# Patient Record
Sex: FEMALE | Race: WHITE | NOT HISPANIC OR LATINO | Employment: FULL TIME | URBAN - METROPOLITAN AREA
[De-identification: names, ages, dates, MRNs, and addresses within clinical notes are randomized per-mention and may not be internally consistent; named-entity substitution may affect disease eponyms.]

---

## 2018-09-05 ENCOUNTER — APPOINTMENT (OUTPATIENT)
Dept: URGENT CARE | Facility: MEDICAL CENTER | Age: 33
End: 2018-09-05
Payer: OTHER MISCELLANEOUS

## 2018-09-05 PROCEDURE — G0382 LEV 3 HOSP TYPE B ED VISIT: HCPCS

## 2018-09-05 PROCEDURE — 99283 EMERGENCY DEPT VISIT LOW MDM: CPT

## 2018-09-13 ENCOUNTER — APPOINTMENT (OUTPATIENT)
Dept: URGENT CARE | Facility: MEDICAL CENTER | Age: 33
End: 2018-09-13
Payer: OTHER MISCELLANEOUS

## 2018-09-13 PROCEDURE — 99214 OFFICE O/P EST MOD 30 MIN: CPT | Performed by: NURSE PRACTITIONER

## 2018-09-27 ENCOUNTER — APPOINTMENT (OUTPATIENT)
Dept: URGENT CARE | Facility: MEDICAL CENTER | Age: 33
End: 2018-09-27
Payer: OTHER MISCELLANEOUS

## 2018-09-27 PROCEDURE — 99213 OFFICE O/P EST LOW 20 MIN: CPT | Performed by: NURSE PRACTITIONER

## 2018-10-04 ENCOUNTER — APPOINTMENT (OUTPATIENT)
Dept: URGENT CARE | Facility: MEDICAL CENTER | Age: 33
End: 2018-10-04
Payer: OTHER MISCELLANEOUS

## 2018-10-04 PROCEDURE — 99213 OFFICE O/P EST LOW 20 MIN: CPT | Performed by: NURSE PRACTITIONER

## 2019-04-11 ENCOUNTER — OFFICE VISIT (OUTPATIENT)
Dept: FAMILY MEDICINE CLINIC | Facility: CLINIC | Age: 34
End: 2019-04-11
Payer: COMMERCIAL

## 2019-04-11 VITALS
HEART RATE: 94 BPM | HEIGHT: 69 IN | WEIGHT: 196 LBS | BODY MASS INDEX: 29.03 KG/M2 | OXYGEN SATURATION: 98 % | DIASTOLIC BLOOD PRESSURE: 80 MMHG | SYSTOLIC BLOOD PRESSURE: 130 MMHG | TEMPERATURE: 99 F

## 2019-04-11 DIAGNOSIS — J02.9 SORE THROAT: Primary | ICD-10-CM

## 2019-04-11 DIAGNOSIS — J02.9 PHARYNGITIS, UNSPECIFIED ETIOLOGY: ICD-10-CM

## 2019-04-11 LAB — S PYO AG THROAT QL: NEGATIVE

## 2019-04-11 PROCEDURE — 1036F TOBACCO NON-USER: CPT | Performed by: NURSE PRACTITIONER

## 2019-04-11 PROCEDURE — 99213 OFFICE O/P EST LOW 20 MIN: CPT | Performed by: NURSE PRACTITIONER

## 2019-04-11 PROCEDURE — 3008F BODY MASS INDEX DOCD: CPT | Performed by: NURSE PRACTITIONER

## 2019-04-11 PROCEDURE — 87880 STREP A ASSAY W/OPTIC: CPT | Performed by: NURSE PRACTITIONER

## 2019-04-11 PROCEDURE — 87070 CULTURE OTHR SPECIMN AEROBIC: CPT | Performed by: NURSE PRACTITIONER

## 2019-04-11 RX ORDER — AZITHROMYCIN 250 MG/1
TABLET, FILM COATED ORAL
Qty: 6 TABLET | Refills: 0 | Status: SHIPPED | OUTPATIENT
Start: 2019-04-11 | End: 2019-04-16

## 2019-04-13 LAB — BACTERIA THROAT CULT: NORMAL

## 2019-04-22 ENCOUNTER — OFFICE VISIT (OUTPATIENT)
Dept: FAMILY MEDICINE CLINIC | Facility: CLINIC | Age: 34
End: 2019-04-22
Payer: COMMERCIAL

## 2019-04-22 VITALS
TEMPERATURE: 98 F | DIASTOLIC BLOOD PRESSURE: 74 MMHG | SYSTOLIC BLOOD PRESSURE: 108 MMHG | HEART RATE: 85 BPM | HEIGHT: 69 IN | WEIGHT: 197 LBS | OXYGEN SATURATION: 98 % | BODY MASS INDEX: 29.18 KG/M2

## 2019-04-22 DIAGNOSIS — J02.9 SORE THROAT: Primary | ICD-10-CM

## 2019-04-22 LAB — S PYO AG THROAT QL: NEGATIVE

## 2019-04-22 PROCEDURE — 3008F BODY MASS INDEX DOCD: CPT | Performed by: FAMILY MEDICINE

## 2019-04-22 PROCEDURE — 87880 STREP A ASSAY W/OPTIC: CPT | Performed by: FAMILY MEDICINE

## 2019-04-22 PROCEDURE — 99213 OFFICE O/P EST LOW 20 MIN: CPT | Performed by: FAMILY MEDICINE

## 2019-04-22 PROCEDURE — 87070 CULTURE OTHR SPECIMN AEROBIC: CPT | Performed by: FAMILY MEDICINE

## 2019-04-22 RX ORDER — MONTELUKAST SODIUM 10 MG/1
10 TABLET ORAL
Qty: 14 TABLET | Refills: 0 | Status: SHIPPED | OUTPATIENT
Start: 2019-04-22

## 2019-04-22 RX ORDER — FLUTICASONE PROPIONATE 50 MCG
2 SPRAY, SUSPENSION (ML) NASAL DAILY
Qty: 16 G | Refills: 0 | Status: SHIPPED | OUTPATIENT
Start: 2019-04-22 | End: 2019-05-22

## 2019-04-24 LAB — BACTERIA THROAT CULT: NORMAL

## 2019-06-25 PROCEDURE — 88185 FLOWCYTOMETRY/TC ADD-ON: CPT | Performed by: OTOLARYNGOLOGY

## 2019-06-25 PROCEDURE — 88305 TISSUE EXAM BY PATHOLOGIST: CPT | Performed by: PATHOLOGY

## 2019-06-25 PROCEDURE — 88184 FLOWCYTOMETRY/ TC 1 MARKER: CPT | Performed by: OTOLARYNGOLOGY

## 2021-06-18 ENCOUNTER — TELEPHONE (OUTPATIENT)
Dept: FAMILY MEDICINE CLINIC | Facility: CLINIC | Age: 36
End: 2021-06-18

## 2021-06-18 NOTE — TELEPHONE ENCOUNTER
LMOM for pt to call back as she has not been seen since 2019  Calling to schedule a physical, as she is past due for this  If we are no longer her PCP, need name of her new Dr so we can update our records

## 2021-10-19 ENCOUNTER — NURSE TRIAGE (OUTPATIENT)
Dept: OTHER | Facility: OTHER | Age: 36
End: 2021-10-19

## 2022-01-19 ENCOUNTER — OFFICE VISIT (OUTPATIENT)
Dept: FAMILY MEDICINE CLINIC | Facility: CLINIC | Age: 37
End: 2022-01-19
Payer: COMMERCIAL

## 2022-01-19 VITALS
DIASTOLIC BLOOD PRESSURE: 80 MMHG | TEMPERATURE: 97.8 F | OXYGEN SATURATION: 98 % | SYSTOLIC BLOOD PRESSURE: 134 MMHG | BODY MASS INDEX: 31.28 KG/M2 | HEART RATE: 84 BPM | HEIGHT: 69 IN | WEIGHT: 211.2 LBS | RESPIRATION RATE: 14 BRPM

## 2022-01-19 DIAGNOSIS — K59.00 CONSTIPATION, UNSPECIFIED CONSTIPATION TYPE: Primary | ICD-10-CM

## 2022-01-19 DIAGNOSIS — M41.25 OTHER IDIOPATHIC SCOLIOSIS, THORACOLUMBAR REGION: ICD-10-CM

## 2022-01-19 DIAGNOSIS — E78.5 DYSLIPIDEMIA: ICD-10-CM

## 2022-01-19 PROCEDURE — 3008F BODY MASS INDEX DOCD: CPT | Performed by: FAMILY MEDICINE

## 2022-01-19 PROCEDURE — 1036F TOBACCO NON-USER: CPT | Performed by: FAMILY MEDICINE

## 2022-01-19 PROCEDURE — 99214 OFFICE O/P EST MOD 30 MIN: CPT | Performed by: FAMILY MEDICINE

## 2022-01-19 NOTE — PROGRESS NOTES
Assessment/Plan:  Await x-ray results  Recommend follow-up with Gastroenterology if no improvement in constipation  Recommend daily fiber supplementation  1  Constipation, unspecified constipation type  -     psyllium (METAMUCIL) 0 52 g capsule; Take 1 capsule (0 52 g total) by mouth daily    2  Other idiopathic scoliosis, thoracolumbar region  Assessment & Plan:  Patient is seeing a chiropractor on a regular basis and he would like x-rays done  X-rays ordered  Consider orthopedic back specialist evaluation if no improvement or worsening symptoms  Continue follow-up with chiropractic treatment and await x-rays  Orders:  -     XR spine cervical complete 4 or 5 vw non injury; Future; Expected date: 01/19/2022  -     XR spine thoracic 3 vw; Future; Expected date: 01/19/2022  -     XR spine lumbar minimum 4 views non injury; Future; Expected date: 01/19/2022    3  Dyslipidemia  Assessment & Plan:  Patient cholesterol levels are slightly elevated  Recommend recheck again in 1 year  We reviewed her levels  Subjective:      Patient ID: Francisco Javier Cox is a 43 y o  female  Patient with multiple concerns  She would like to review her cholesterol levels  Cholesterol is slightly elevated on last lab testing  She also notes chronic constipation issues from the time she was a teenager  She goes about twice weekly and notes it is occasionally worse  She also was referred over to us from her chiropractor who would like to have x-rays done for history of scoliosis and chronic back pain  The following portions of the patient's history were reviewed and updated as appropriate: allergies, current medications, past family history, past medical history, past social history, past surgical history, and problem list     Review of Systems   Constitutional: Negative  HENT: Negative  Eyes: Negative  Respiratory: Negative  Cardiovascular: Negative  Gastrointestinal: Negative  Endocrine: Negative  Genitourinary: Negative  Musculoskeletal: Positive for back pain  Skin: Negative  Allergic/Immunologic: Negative  Neurological: Negative  Hematological: Negative  Psychiatric/Behavioral: Negative  Objective:      /80 (BP Location: Left arm, Patient Position: Sitting, Cuff Size: Standard)   Pulse 84   Temp 97 8 °F (36 6 °C) (Temporal)   Resp 14   Ht 5' 9" (1 753 m)   Wt 95 8 kg (211 lb 3 2 oz)   SpO2 98%   BMI 31 19 kg/m²          Physical Exam  Vitals reviewed  Constitutional:       Appearance: She is well-developed  HENT:      Head: Normocephalic and atraumatic  Right Ear: External ear normal  Tympanic membrane is not erythematous or bulging  Left Ear: External ear normal  Tympanic membrane is not erythematous or bulging  Nose: Nose normal       Mouth/Throat:      Mouth: No oral lesions  Pharynx: No oropharyngeal exudate  Eyes:      General: No scleral icterus  Right eye: No discharge  Left eye: No discharge  Conjunctiva/sclera: Conjunctivae normal    Neck:      Thyroid: No thyromegaly  Cardiovascular:      Rate and Rhythm: Normal rate and regular rhythm  Heart sounds: Normal heart sounds  No murmur heard  No friction rub  No gallop  Pulmonary:      Effort: Pulmonary effort is normal  No respiratory distress  Breath sounds: No wheezing or rales  Chest:      Chest wall: No tenderness  Abdominal:      General: Bowel sounds are normal  There is no distension  Palpations: Abdomen is soft  There is no mass  Tenderness: There is no abdominal tenderness  There is no guarding or rebound  Musculoskeletal:         General: No tenderness or deformity  Normal range of motion  Cervical back: Normal range of motion and neck supple  Lymphadenopathy:      Cervical: No cervical adenopathy  Skin:     General: Skin is warm and dry  Coloration: Skin is not pale        Findings: No erythema or rash  Neurological:      Mental Status: She is alert and oriented to person, place, and time  Cranial Nerves: No cranial nerve deficit  Motor: No abnormal muscle tone  Coordination: Coordination normal       Deep Tendon Reflexes: Reflexes are normal and symmetric     Psychiatric:         Behavior: Behavior normal

## 2022-01-19 NOTE — ASSESSMENT & PLAN NOTE
Patient is seeing a chiropractor on a regular basis and he would like x-rays done  X-rays ordered  Consider orthopedic back specialist evaluation if no improvement or worsening symptoms  Continue follow-up with chiropractic treatment and await x-rays

## 2022-01-19 NOTE — ASSESSMENT & PLAN NOTE
Patient cholesterol levels are slightly elevated  Recommend recheck again in 1 year  We reviewed her levels

## 2022-06-15 ENCOUNTER — OFFICE VISIT (OUTPATIENT)
Dept: URGENT CARE | Age: 37
End: 2022-06-15
Payer: COMMERCIAL

## 2022-06-15 ENCOUNTER — APPOINTMENT (OUTPATIENT)
Dept: RADIOLOGY | Age: 37
End: 2022-06-15
Payer: COMMERCIAL

## 2022-06-15 VITALS
OXYGEN SATURATION: 99 % | TEMPERATURE: 98.9 F | HEART RATE: 74 BPM | WEIGHT: 220 LBS | BODY MASS INDEX: 33.34 KG/M2 | HEIGHT: 68 IN | RESPIRATION RATE: 18 BRPM

## 2022-06-15 DIAGNOSIS — S99.922A INJURY OF TOE ON LEFT FOOT, INITIAL ENCOUNTER: ICD-10-CM

## 2022-06-15 DIAGNOSIS — S99.922A INJURY OF TOE ON LEFT FOOT, INITIAL ENCOUNTER: Primary | ICD-10-CM

## 2022-06-15 PROCEDURE — 73630 X-RAY EXAM OF FOOT: CPT

## 2022-06-15 PROCEDURE — 99213 OFFICE O/P EST LOW 20 MIN: CPT

## 2022-06-15 NOTE — PROGRESS NOTES
[facility]        NAME: Marley Romano is a 42 y o  female  : 1985    MRN: [id number]  DATE: 2022  TIME: 11:29 AM    Assessment and Plan   Injury of toe on left foot, initial encounter [S99 922A]  1  Injury of toe on left foot, initial encounter  XR foot 3+ vw left    Ambulatory Referral to Orthopedic Surgery     patient states an hour and a half ago she tripped down the step and hurt her left second toe- swollen, no bruising  Ambulating  Good pulses    Mild swelling noted to second left toe  No bruising or deformity  Xray performed in office indicates oblique fracture to 2nd proximal phalanx    Advised to ice, buddy tape elevate and take motrin   Referral to ortho placed    Patient Instructions       Follow up with PCP as needed    Chief Complaint     Chief Complaint   Patient presents with    Toe Injury     Pt fell down the stairs injuring her Left toe         History of Present Illness       patient states an hour and a half ago she tripped down the step and hurt her left second toe- swollen, no bruising  Ambulating  Good pulses      Review of Systems   Review of Systems   Constitutional: Negative for chills, fatigue and fever  HENT: Negative for postnasal drip and sore throat  Respiratory: Negative for cough and shortness of breath  Cardiovascular: Negative for chest pain and palpitations  Gastrointestinal: Negative for abdominal pain, nausea and vomiting  Genitourinary: Negative for dysuria  Musculoskeletal: Positive for joint swelling  Negative for gait problem  Skin: Negative for rash  Neurological: Negative for dizziness, syncope, light-headedness, numbness and headaches  Psychiatric/Behavioral: Negative for agitation and confusion  All other systems reviewed and are negative          Current Medications       Current Outpatient Medications:     fluticasone (FLONASE) 50 mcg/act nasal spray, 2 sprays into each nostril daily for 30 days, Disp: 16 g, Rfl: 0   MAGIC MOUTHWASH - COMPOUNDING, , Disp: , Rfl:     montelukast (SINGULAIR) 10 mg tablet, Take 1 tablet (10 mg total) by mouth daily at bedtime (Patient not taking: No sig reported), Disp: 14 tablet, Rfl: 0    predniSONE 20 mg tablet, Take 3 tabs daily x3 days, then 2 tabs daily x3 days, then 1tab daily x 3 days (Patient not taking: Reported on 6/15/2022), Disp: 18 tablet, Rfl: 1    psyllium (METAMUCIL) 0 52 g capsule, Take 1 capsule (0 52 g total) by mouth daily (Patient not taking: Reported on 6/15/2022), Disp: 90 capsule, Rfl: 1    Current Allergies     Allergies as of 06/15/2022    (No Known Allergies)            The following portions of the patient's history were reviewed and updated as appropriate: allergies, current medications, past family history, past medical history, past social history, past surgical history and problem list      No past medical history on file  Past Surgical History:   Procedure Laterality Date    UMBILICAL HERNIA REPAIR         Family History   Problem Relation Age of Onset    Diabetes Mother     Hypertension Father     Hyperlipidemia Father     Diabetes Maternal Grandmother          Medications have been verified  Objective   Pulse 74   Temp 98 9 °F (37 2 °C)   Resp 18   Ht 5' 8" (1 727 m)   Wt 99 8 kg (220 lb)   SpO2 99%   BMI 33 45 kg/m²   No LMP recorded  Physical Exam     Physical Exam  Vitals reviewed  Constitutional:       General: She is not in acute distress  Appearance: Normal appearance  She is not ill-appearing  HENT:      Head: Normocephalic and atraumatic  Eyes:      Extraocular Movements: Extraocular movements intact  Conjunctiva/sclera: Conjunctivae normal    Musculoskeletal:         General: Swelling, tenderness and signs of injury present  Cervical back: Normal range of motion  Left foot: Normal range of motion  Feet:    Feet:      Left foot:      Skin integrity: No erythema or warmth        Comments: Swelling noted   Skin:     General: Skin is warm  Neurological:      General: No focal deficit present  Mental Status: She is alert     Psychiatric:         Mood and Affect: Mood normal          Behavior: Behavior normal          Judgment: Judgment normal

## 2024-02-14 ENCOUNTER — TELEPHONE (OUTPATIENT)
Dept: FAMILY MEDICINE CLINIC | Facility: CLINIC | Age: 39
End: 2024-02-14

## 2024-02-14 NOTE — TELEPHONE ENCOUNTER
LMOM for patient to contact the office to schedule her annual physical. Last office visit was 1/19/2022. If patient has a new PCP and/or has moved, please obtain new information.

## 2024-04-23 ENCOUNTER — TELEPHONE (OUTPATIENT)
Dept: FAMILY MEDICINE CLINIC | Facility: CLINIC | Age: 39
End: 2024-04-23

## 2024-04-24 ENCOUNTER — TELEPHONE (OUTPATIENT)
Dept: FAMILY MEDICINE CLINIC | Facility: CLINIC | Age: 39
End: 2024-04-24

## 2024-05-07 NOTE — TELEPHONE ENCOUNTER
Three Attempts Made    Attempt #1 2/14/24  Attempt #2 4/23/24  Attempt #3 4/24/24    Certified Letter will not be mailed out.